# Patient Record
Sex: MALE | Race: WHITE | NOT HISPANIC OR LATINO
[De-identification: names, ages, dates, MRNs, and addresses within clinical notes are randomized per-mention and may not be internally consistent; named-entity substitution may affect disease eponyms.]

---

## 2017-05-03 ENCOUNTER — RECORDS - HEALTHEAST (OUTPATIENT)
Dept: ADMINISTRATIVE | Facility: OTHER | Age: 57
End: 2017-05-03

## 2017-05-04 ENCOUNTER — RECORDS - HEALTHEAST (OUTPATIENT)
Dept: ADMINISTRATIVE | Facility: OTHER | Age: 57
End: 2017-05-04

## 2017-06-07 ENCOUNTER — OFFICE VISIT - HEALTHEAST (OUTPATIENT)
Dept: SURGERY | Facility: CLINIC | Age: 57
End: 2017-06-07

## 2017-06-07 DIAGNOSIS — K57.32 DIVERTICULITIS OF LARGE INTESTINE WITHOUT PERFORATION OR ABSCESS WITHOUT BLEEDING: ICD-10-CM

## 2017-06-07 RX ORDER — TAMSULOSIN HYDROCHLORIDE 0.4 MG/1
0.4 CAPSULE ORAL DAILY
Refills: 1 | Status: SHIPPED | COMMUNITY
Start: 2017-05-18

## 2017-06-07 ASSESSMENT — MIFFLIN-ST. JEOR: SCORE: 1429.07

## 2017-06-09 ENCOUNTER — RECORDS - HEALTHEAST (OUTPATIENT)
Dept: ADMINISTRATIVE | Facility: OTHER | Age: 57
End: 2017-06-09

## 2017-06-13 ENCOUNTER — ANESTHESIA - HEALTHEAST (OUTPATIENT)
Dept: SURGERY | Facility: HOSPITAL | Age: 57
End: 2017-06-13

## 2017-06-13 ENCOUNTER — SURGERY - HEALTHEAST (OUTPATIENT)
Dept: SURGERY | Facility: HOSPITAL | Age: 57
End: 2017-06-13

## 2017-06-14 ASSESSMENT — MIFFLIN-ST. JEOR: SCORE: 1420.14

## 2017-06-15 ASSESSMENT — MIFFLIN-ST. JEOR: SCORE: 1419.55

## 2017-06-19 ENCOUNTER — COMMUNICATION - HEALTHEAST (OUTPATIENT)
Dept: SURGERY | Facility: CLINIC | Age: 57
End: 2017-06-19

## 2017-06-19 DIAGNOSIS — G89.18 POST-OPERATIVE PAIN: ICD-10-CM

## 2017-06-21 ENCOUNTER — AMBULATORY - HEALTHEAST (OUTPATIENT)
Dept: SURGERY | Facility: CLINIC | Age: 57
End: 2017-06-21

## 2017-06-21 DIAGNOSIS — K57.32 DIVERTICULITIS OF LARGE INTESTINE WITHOUT PERFORATION OR ABSCESS WITHOUT BLEEDING: ICD-10-CM

## 2017-06-21 DIAGNOSIS — G89.18 POST-OPERATIVE PAIN: ICD-10-CM

## 2017-06-26 ENCOUNTER — COMMUNICATION - HEALTHEAST (OUTPATIENT)
Dept: SURGERY | Facility: CLINIC | Age: 57
End: 2017-06-26

## 2017-06-26 DIAGNOSIS — G89.18 POST-OPERATIVE PAIN: ICD-10-CM

## 2017-06-26 DIAGNOSIS — K57.32 DIVERTICULITIS OF LARGE INTESTINE WITHOUT PERFORATION OR ABSCESS WITHOUT BLEEDING: ICD-10-CM

## 2017-06-28 ENCOUNTER — OFFICE VISIT - HEALTHEAST (OUTPATIENT)
Dept: SURGERY | Facility: CLINIC | Age: 57
End: 2017-06-28

## 2017-06-28 DIAGNOSIS — K57.32 DIVERTICULITIS OF LARGE INTESTINE WITHOUT PERFORATION OR ABSCESS WITHOUT BLEEDING: ICD-10-CM

## 2017-06-28 DIAGNOSIS — R50.9 FEVER CHILLS: ICD-10-CM

## 2017-06-28 ASSESSMENT — MIFFLIN-ST. JEOR: SCORE: 1406.39

## 2018-12-19 ENCOUNTER — RECORDS - HEALTHEAST (OUTPATIENT)
Dept: ADMINISTRATIVE | Facility: OTHER | Age: 58
End: 2018-12-19

## 2019-08-16 ENCOUNTER — RECORDS - HEALTHEAST (OUTPATIENT)
Dept: RADIOLOGY | Facility: CLINIC | Age: 59
End: 2019-08-16

## 2019-08-16 ENCOUNTER — RECORDS - HEALTHEAST (OUTPATIENT)
Dept: ADMINISTRATIVE | Facility: OTHER | Age: 59
End: 2019-08-16

## 2019-08-20 ENCOUNTER — RECORDS - HEALTHEAST (OUTPATIENT)
Dept: RADIOLOGY | Facility: CLINIC | Age: 59
End: 2019-08-20

## 2019-08-20 ENCOUNTER — RECORDS - HEALTHEAST (OUTPATIENT)
Dept: ADMINISTRATIVE | Facility: OTHER | Age: 59
End: 2019-08-20

## 2019-09-05 ENCOUNTER — AMBULATORY - HEALTHEAST (OUTPATIENT)
Dept: VASCULAR SURGERY | Facility: CLINIC | Age: 59
End: 2019-09-05

## 2019-09-05 DIAGNOSIS — I73.9 PAD (PERIPHERAL ARTERY DISEASE) (H): ICD-10-CM

## 2019-09-06 ENCOUNTER — OFFICE VISIT - HEALTHEAST (OUTPATIENT)
Dept: VASCULAR SURGERY | Facility: CLINIC | Age: 59
End: 2019-09-06

## 2019-09-06 ENCOUNTER — RECORDS - HEALTHEAST (OUTPATIENT)
Dept: VASCULAR ULTRASOUND | Facility: CLINIC | Age: 59
End: 2019-09-06

## 2019-09-06 DIAGNOSIS — I73.9 PERIPHERAL VASCULAR DISEASE, UNSPECIFIED (H): ICD-10-CM

## 2019-09-06 DIAGNOSIS — I73.9 PAD (PERIPHERAL ARTERY DISEASE) (H): ICD-10-CM

## 2019-09-06 RX ORDER — NEBIVOLOL HYDROCHLORIDE 10 MG/1
1 TABLET ORAL DAILY
Refills: 1 | Status: SHIPPED | COMMUNITY
Start: 2019-08-22

## 2019-09-06 RX ORDER — CILOSTAZOL 100 MG/1
100 TABLET ORAL 2 TIMES DAILY
Status: SHIPPED | COMMUNITY
Start: 2019-08-02 | End: 2020-08-01

## 2019-09-06 RX ORDER — ATENOLOL AND CHLORTHALIDONE TABLET 50; 25 MG/1; MG/1
1 TABLET ORAL DAILY
Refills: 3 | Status: SHIPPED | COMMUNITY
Start: 2019-03-18

## 2019-09-06 RX ORDER — CYCLOBENZAPRINE HCL 10 MG
10 TABLET ORAL 3 TIMES DAILY PRN
Status: SHIPPED | COMMUNITY
Start: 2018-10-10

## 2019-09-06 RX ORDER — ROSUVASTATIN CALCIUM 5 MG/1
5 TABLET, COATED ORAL DAILY
Status: SHIPPED | COMMUNITY
Start: 2019-08-16

## 2019-09-06 ASSESSMENT — MIFFLIN-ST. JEOR: SCORE: 1508.46

## 2019-10-12 ENCOUNTER — AMBULATORY - HEALTHEAST (OUTPATIENT)
Dept: VASCULAR SURGERY | Facility: CLINIC | Age: 59
End: 2019-10-12

## 2019-10-12 DIAGNOSIS — I73.9 PAD (PERIPHERAL ARTERY DISEASE) (H): ICD-10-CM

## 2019-10-12 RX ORDER — NIFEDIPINE 30 MG
30 TABLET, EXTENDED RELEASE ORAL DAILY
Qty: 90 TABLET | Refills: 2 | Status: SHIPPED | OUTPATIENT
Start: 2019-10-12

## 2021-05-30 VITALS — HEIGHT: 68 IN | BODY MASS INDEX: 21.37 KG/M2 | WEIGHT: 141 LBS

## 2021-05-31 VITALS — HEIGHT: 68 IN | BODY MASS INDEX: 21.05 KG/M2 | WEIGHT: 138.9 LBS

## 2021-05-31 VITALS — WEIGHT: 136 LBS | HEIGHT: 68 IN | BODY MASS INDEX: 20.61 KG/M2

## 2021-06-01 NOTE — PROGRESS NOTES
CONSULT. 2nd opinion. Had bilateral kissing common iliac stents that were placed on 12/28 complicated with development of right common femoral artery pseudo-aneurysm for which he underwent US guided thrombin injection-saw Dr. Hensley in [place]. CT images loaded into chart. Starts with right leg claudication then spreads over to left. On cilostazol.

## 2021-06-02 NOTE — PROGRESS NOTES
VASCULAR SURGERY CLINIC CONSULTATION    VASCULAR SURGEON: Jean Reyez MD    LOCATION:  [facility]    Carroll Sherwood   Medical Record #:  [id number]  YOB: 1960  Age:  58 y.o.     Date of Service: 9/6/2019    PRIMARY CARE PROVIDER: Blake Souza MD      Reason for visit: Bilateral lower extremity peripheral arterial disease.  Right is worse than the left.    IMPRESSION: Bilateral lower extremity peripheral artery disease.  Right is worse than the left.  Right side is improved following arterial intervention with common iliac artery stenting.    RECOMMENDATION/RISKS: In review of the findings my recommendation would be to continue with aggressive medical management and walking program.  I would not recommend any additional intervention in the infrainguinal arterial tree for claudication alone.    HPI:  Carroll Sherwood is a 58 y.o. male who was seen today in consultation for second opinion in regards to his right lower extremity peripheral arterial disease.  He was seen by Dr. Hensley in [place] in the fall 2018.  He underwent noninvasive studies which showed that the resting ankle-brachial index on the right side was 0.6 and on the left side it was 0.96.  Following exercise there were no pulses detectable in the right foot for the first 4 minutes.  There was minimal change on the left side.  He underwent a CT angiogram which showed that there was high-grade stenosis of the right common iliac artery.  Additional findings were high-grade stenosis of the proximal right superficial femoral artery, popliteal artery and tibioperoneal trunks.  He also had atherosclerotic disease identified in the left common femoral artery, proximal left superficial femoral artery and distal left superficial femoral artery at the level of the Sherman's canal.  December 2018 he underwent bilateral common iliac artery stenting with 8 mm VBX stents.  Since that time there has been some improvement in his  walking distance.  He still able to walk about 3-4 blocks without having any difficulty.  He does not have rest pain.  REVIEW OF SYSTEMS:    A 12 point ROS was reviewed and is negative except for as noted in history of presenting illness.     PMH:    Past Medical History:   Diagnosis Date     ADHD (attention deficit hyperactivity disorder)      Degenerative cervical disc      Diverticulitis of colon      Hepatitis C      Hypertension      Irritable bowel      Muscle weakness         Past Surgical History:   Procedure Laterality Date     INGUINAL HERNIA REPAIR Bilateral      LAPAROSCOPIC COLON RESECTION Left 6/13/2017    Procedure: SIGMOID COLECTOMY, LAPAROSCOPIC;  Surgeon: Donaldo Bermudez MD;  [place] [facility] [place]:      S[place]US SURGERY         ALLERGIES:  Lactose; Sulfa (sulfonamide antibiotics); and Vicodin [hydrocodone-acetaminophen]    MEDS:    Current Outpatient Medications:      atenolol-chlorthalidone (TENORETIC) 50-25 mg per tablet, Take 1 tablet by mouth daily., Disp: , Rfl: 3     BYSTOLIC 10 mg tablet, Take 1 tablet by mouth daily., Disp: , Rfl: 1     cilostazol (PLETAL) 100 MG tablet, Take 100 mg by mouth 2 (two) times a day., Disp: , Rfl:      coQ10, ubiquinol, 100 mg cap, Take 1 tablet by mouth daily., Disp: , Rfl:      cyclobenzaprine (FLEXERIL) 10 MG tablet, Take 10 mg by mouth 3 (three) times a day as needed., Disp: , Rfl:      dextroamphetamine (DEXEDRINE SPANSULE) 15 MG 24 hr capsule, Take 15 mg by mouth 2 times a day at 6:00 am and 4:00 pm. , Disp: , Rfl: 0     Lactobacillus rhamnosus GG (CULTURELLE) 10-15 Billion cell capsule, Take 1 capsule by mouth daily., Disp: , Rfl:      omeprazole (PRILOSEC) 20 MG capsule, Take 40 mg by mouth daily before breakfast.    , Disp: , Rfl:      rosuvastatin (CRESTOR) 5 MG tablet, Take 5 mg by mouth daily., Disp: , Rfl:      tamsulosin (FLOMAX) 0.4 mg Cp24, Take 0.4 mg by mouth daily., Disp: , Rfl: 1     NIFEdipine (ADALAT CC) 30 MG 24 hr tablet,  "Take 1 tablet (30 mg total) by mouth daily., Disp: 30 tablet, Rfl: 0    SOCIAL HABITS:    Social History     Tobacco Use   Smoking Status Former Smoker     Packs/day: 0.50     Types: Cigarettes     Last attempt to quit: 2018     Years since quittin.8   Smokeless Tobacco Never Used       Social History     Substance and Sexual Activity   Alcohol Use No    Comment: quit 22 years ago       Social History     Substance and Sexual Activity   Drug Use No       FAMILY HISTORY:    Family History   Problem Relation Age of Onset     Heart attack Mother      Fibromyalgia Mother      Hypertension Father      Heart disease Sister      Heart disease Brother      Obesity Brother      Heart attack Brother      No Medical Problems Sister      Diabetes Brother      No Medical Problems Brother            PE:  /80   Pulse 64   Temp 98  F (36.7  C)   Resp 18   Ht 5' 7\" (1.702 m)   Wt 162 lb (73.5 kg)   BMI 25.37 kg/m    Wt Readings from Last 1 Encounters:   19 162 lb (73.5 kg)     Body mass index is 25.37 kg/m .    EXAM:  GENERAL: This is a well-developed 58 y.o. male who appears his stated age  EYES: Grossly normal.  MOUTH: Buccal mucosa normal   CARDIAC:  NS1 S2, No Murmur  CHEST/LUNG:  Clear lung fields bilaterally   GASTROINTESINAL (ABDOMEN): Soft, non-tender, B/S present, no pulsatile mass  MUSCULOSKELETAL: Grossly normal and both lower extremities are intact.  HEME/LYMPH: No lymphedema  NEUROLOGIC: Focally intact, Alert and oriented x 3.   PSYCH: appropriate affect  INTEGUMENT: No open lesions or ulcers  Pulse Exam:   Carotid: No Bruit    Radial: Left +2   Right  +2    Femoral: Left +3   Right  +3  Right dorsalis pedis and posterior tibial are monophasic.  Left dorsalis pedis and posterior tibial are biphasic.     DIAGNOSTIC STUDIES: Resting right ankle-brachial index is 0.93 which drops to 0.27 following exercise.  Resting left ankle-brachial index is 1.11 which dropped to 0.61 following exercise.  This " is an improvement from prior to his iliac stent as looking back at the studies at [place] prior to the stent the right lower extremity pulses were nondetectable for the first 4 minutes following exercise.      Total time spent 45 minutes face to face with patient with more than 50% time spent in counseling and coordination of care.    Jean Reyez MD  VASCULAR SURGERY

## 2021-06-03 VITALS
HEIGHT: 67 IN | WEIGHT: 162 LBS | DIASTOLIC BLOOD PRESSURE: 80 MMHG | HEART RATE: 64 BPM | RESPIRATION RATE: 18 BRPM | BODY MASS INDEX: 25.43 KG/M2 | TEMPERATURE: 98 F | SYSTOLIC BLOOD PRESSURE: 136 MMHG

## 2021-06-11 NOTE — PROGRESS NOTES
GENERAL SURGICAL CONSULTATION    I was requested by Blake Souza MD to consult on this pt to evaluate them for Diverticulitis.     HPI:  This is a 56 y.o. male here today with troubles involving his abdomen.  He has had difficulty with abdominal pain and bloating which is been going on for just short of 2 years.  Over the last year and a half he is gone approximately 8 courses of antibiotics to deal with recurrent episodes of diverticulitis.  He had a colonoscopy about a year ago was found he had profound diverticular disease.  He had a flexible sigmoidoscopy about a month ago where they saw evidence of progression of his diverticular disease with narrowing of the colon through the sigmoid colon segment and a significant fixed loop of sigmoid colon down in the pelvis.  He had a CT scan done 6 weeks ago which showed diverticulitis of the sigmoid colon.  He had been on an extended course of antibiotics following that.  There is been other evaluation of his colon which at one time showed evidence for potential Clostridium difficile and I saw one stool analysis that showed possible Giardia.  The patient has been on treatment with Flagyl over the last month and is not having troubles with diarrhea at this point.  I think the Flagyl should have dealt with those issues sufficiently by now.  The patient has been advised to have a sigmoid colon resection due to the intractable problems he has had with his diverticular disease.      Allergies:Sulfa (sulfonamide antibiotics)     Patient is a smoker    Past Medical History:   Diagnosis Date     Hypertension        Past Surgical History:   Procedure Laterality Date     INGUINAL HERNIA REPAIR Bilateral      SINUS SURGERY         CURRENT MEDS:  Current Outpatient Prescriptions   Medication Sig Dispense Refill     dextroamphetamine (DEXEDRINE SPANSULE) 15 MG 24 hr capsule Take 15 mg by mouth 2 (two) times a day.  0     metoprolol succinate (TOPROL-XL) 50 MG 24 hr tablet Take 50  "mg by mouth 2 (two) times a day.       omeprazole (PRILOSEC) 40 MG capsule Take 40 mg by mouth Daily before breakfast.       tamsulosin (FLOMAX) 0.4 mg Cp24 Take 0.4 mg by mouth daily.  1     No current facility-administered medications for this visit.        Family History   Problem Relation Age of Onset     Heart attack Mother      Fibromyalgia Mother      Hypertension Father      Heart disease Sister      Heart disease Brother      Obesity Brother      Heart attack Brother      No Medical Problems Sister      Diabetes Brother      No Medical Problems Brother      Family history is not pertinent to this patients Chief Complaint.     reports that he has been smoking.  He has never used smokeless tobacco. He reports that he does not drink alcohol or use illicit drugs.    Review of Systems -   10 point Review of systems is negative except for; as mentioned above in HPI and PMHx    /73  Pulse 66  Ht 5' 8\" (1.727 m)  Wt 141 lb (64 kg)  SpO2 97%  BMI 21.44 kg/m2  Body mass index is 21.44 kg/(m^2).    EXAM:  GENERAL: Well developed male, thin he has been losing weight  HEENT: EOMI, Anicteric Sclera, Moist Mucous Membranes,  In Mouth the pt does not have redness or bleeding gums  CARDIOVASCULAR: RRR w/out murmur   CHEST/LUNG: Clear to Auscultation  ABDOMEN:  Non tender to palpation but he does have some mild discomfort when palpating in the lower midline and the right paramedian lower midline.  I can feel the mesh that is been placed with his laparoscopic hernia repairs, +BS,  MUSCULOSKELETAL:  No deformities with good range of motion in all extremities  NEURO: He is ambulatory with good strength in both legs.  HEME/LYMPH: No Cervical Adenopathy or tenderness.     IMAGES:  I evaluated the report of a CT scan from a month ago the images themselves are not available to me but the report reads the patient has sigmoid diverticulosis and evidence for diverticulitis.    Assessment/Plan:  56-year-old gentleman that " has been getting repeated treatments for diverticular disease over the last 18 months.  He recently had another flare of diverticulitis about a month ago and has been on antibiotics since then which is just completed.  Endoscopies corroborate the evidence that diverticular disease is the principal problem causing the patient's GI distress.  I believe this gentleman is most likely to benefit from a sigmoid colon resection.  Given his thin body habitus and no prior abdominal surgeries I think it would make sense to approach this procedure laparoscopically.  It is possible that would require conversion to an open surgery.      Donaldo Bermudez MD  [facility]  [phone]

## 2021-06-11 NOTE — PROGRESS NOTES
"HPI: Pt is here for follow up of a excision of his sigmoid colon..   he is doing well from the standpoint of his GI function.  He has had no nausea or vomiting is having normal bowel movements..  Pain is well controlled.  He developed some swelling and discoloration over the lower midline wound for that he went into the emergency room up in [place] and the wound was opened.  There they evacuated considerable amount of hematoma.  Patient also describes some chills although his temperatures been evaluated a few times he does not have fever.      /67 (Patient Site: Right Arm, Patient Position: Sitting, Cuff Size: Adult Regular)  Pulse (!) 51  Temp 98.6  F (37  C) (Oral)   Resp 18  Ht 5' 8\" (1.727 m)  Wt 136 lb (61.7 kg)  SpO2 100%  BMI 20.68 kg/m2    EXAM:  GENERAL:Appears well  ABDOMEN:  Soft, +BS  SURGICAL WOUNDS:  Incisions healing well, no enduration or drainage.  Lower midline wound is somewhat fluctuant but no induration.  It has discoloration of an underlying hematoma.  The wound itself was prepped with Betadine it was anesthetized with 1% lidocaine with epinephrine and an 18-gauge needle was advanced in the subcutaneous space.  I did aspirate 3 or 4 cc of bloody fluid.  It did not have the appearance of an abscess.    WBC 4.0 - 11.0 thou/uL 6.8   RBC 4.40 - 6.20 mill/uL 4.18 (L)   Hemoglobin 14.0 - 18.0 g/dL 13.3 (L)   Hematocrit 40.0 - 54.0 % 39.2 (L)   MCV 80 - 100 fL 94   MCH 27.0 - 34.0 pg 31.8   MCHC 32.0 - 36.0 g/dL 33.9   RDW 11.0 - 14.5 % 12.9   Platelets 140 - 440 thou/uL 269   MPV 8.5 - 12.5 fL 10.3         Assessment/Plan: . Doing well after surgery but will need to keep an eye on the wound.  As the wound is been opened up and the hematoma drained he does have risk for infection.  The patient has been started on Keflex which I told him to continue for 2 days and then stop.  The chills that he has experienced are of an unclear etiology he has had no fevers at this time is white blood " cell count is 6.8 on top of that he has been having normal bowel movements indicating that he does not appear to have an active infection.  He should follow up as needed.      Donaldo Bermudez MD  [facility] Department of Surgery

## 2021-06-11 NOTE — ANESTHESIA POSTPROCEDURE EVALUATION
Patient: Carroll Sherwood  SIGMOID COLECTOMY, LAPAROSCOPIC  Anesthesia type: general    Patient location: PACU  Last vitals:   Vitals:    06/13/17 1820   BP: 168/85   Pulse:    Resp:    Temp:    SpO2:      Post vital signs: stable  Level of consciousness: awake and responds to simple questions  Post-anesthesia pain: pain controlled  Post-anesthesia nausea and vomiting: no  Pulmonary: unassisted, return to baseline  Cardiovascular: stable and blood pressure at baseline  Hydration: adequate  Anesthetic events: no    QCDR Measures:  ASA# 11 - Emely-op Cardiac Arrest: ASA11B - Patient did NOT experience unanticipated cardiac arrest  ASA# 12 - Emely-op Mortality Rate: ASA12B - Patient did NOT die  ASA# 13 - PACU Re-Intubation Rate: ASA13B - Patient did NOT require a new airway mgmt  ASA# 10 - Composite Anes Safety: ASA10A - No serious adverse event  ASA# 38 - New Corneal Injury: ASA38A - No new exposure keratitis or corneal abrasion in PACU    Additional Notes:

## 2021-06-11 NOTE — ANESTHESIA CARE TRANSFER NOTE
Last vitals:   Vitals:    06/13/17 1708   BP: 175/89   Pulse: 80   Resp: 16   Temp: 36  C (96.8  F)   SpO2: 100%     Patient's level of consciousness is drowsy  Spontaneous respirations: yes  Maintains airway independently: yes  Dentition unchanged: yes  Oropharynx: oropharynx clear of all foreign objects    QCDR Measures:  ASA# 20 - Surgical Safety Checklist: ASA20A - Safety Checks Done  PQRS# 430 - Adult PONV Prevention: 4558F - Pt received => 2 anti-emetic agents (different classes) preop & intraop  ASA# 8 - Peds PONV Prevention: NA - Not pediatric patient, not GA or 2 or more risk factors NOT present  PQRS# 424 - Emely-op Temp Management: 4559F - At least one body temp DOCUMENTED => 35.5C or 95.9F within required timeframe  PQRS# 426 - PACU Transfer Protocol: - Transfer of care checklist used  ASA# 14 - Acute Post-op Pain: ASA14B - Patient did NOT experience pain >= 7 out of 10.  Patient denied pain upon arrival to PACU.

## 2021-06-11 NOTE — ANESTHESIA PREPROCEDURE EVALUATION
Anesthesia Evaluation      Patient summary reviewed   No history of anesthetic complications     Airway   Mallampati: II  Neck ROM: full   Pulmonary - normal exam   (+) a smoker                         Cardiovascular - normal exam  (+) hypertension, CAD (Cardiac stress test March, 2017 was normal.), ,   Received beta blockers in last 24 hours prior to incision.  ,  ECG reviewed        Neuro/Psych    (+) chronic pain (Chronic back pain)  (-) no seizures, no CVA    Comments: ADD    Endo/Other    (-) no diabetes     Comments: Degenerative cervical disc dz    GI/Hepatic/Renal    (+)   impaired hepatic function (Hepatitis C - Resolved after Harvoni treatment.)  (-) GERD    Comments: BPH  IBS  Diverticulitis  PUD     Other findings: Cr 0.81, K 4.6      Dental    (+) caps                       Anesthesia Plan  Planned anesthetic: general endotracheal  Decadron 10 mg IV  ASA 3   Induction: intravenous   Anesthetic plan and risks discussed with: patient and spouse  Anesthesia plan special considerations: antiemetics,   Post-op plan: routine recovery

## 2021-06-16 PROBLEM — Z90.49 S/P LAPAROSCOPIC-ASSISTED SIGMOIDECTOMY: Status: ACTIVE | Noted: 2017-06-13
